# Patient Record
Sex: MALE | Race: ASIAN | URBAN - METROPOLITAN AREA
[De-identification: names, ages, dates, MRNs, and addresses within clinical notes are randomized per-mention and may not be internally consistent; named-entity substitution may affect disease eponyms.]

---

## 2017-02-02 ENCOUNTER — OFFICE VISIT (OUTPATIENT)
Dept: FAMILY MEDICINE | Facility: CLINIC | Age: 32
End: 2017-02-02

## 2017-02-02 VITALS
TEMPERATURE: 99.8 F | RESPIRATION RATE: 18 BRPM | DIASTOLIC BLOOD PRESSURE: 70 MMHG | HEIGHT: 66 IN | HEART RATE: 88 BPM | BODY MASS INDEX: 29.41 KG/M2 | SYSTOLIC BLOOD PRESSURE: 116 MMHG | WEIGHT: 183 LBS | OXYGEN SATURATION: 97 %

## 2017-02-02 DIAGNOSIS — J45.901 ASTHMA EXACERBATION: Primary | ICD-10-CM

## 2017-02-02 RX ORDER — PREDNISONE 20 MG/1
40 TABLET ORAL DAILY
Qty: 10 TABLET | Refills: 0 | Status: SHIPPED | OUTPATIENT
Start: 2017-02-02 | End: 2017-02-07

## 2017-02-02 RX ORDER — ALBUTEROL SULFATE 90 UG/1
1-2 AEROSOL, METERED RESPIRATORY (INHALATION) EVERY 6 HOURS PRN
Qty: 1 INHALER | Refills: 0 | Status: SHIPPED | OUTPATIENT
Start: 2017-02-02 | End: 2017-03-04

## 2017-02-02 NOTE — Clinical Note
I have completed my note, please review, add tie in statement and close encounter.   Thanks!   Jazmin Bauer

## 2017-02-02 NOTE — MR AVS SNAPSHOT
After Visit Summary   2/2/2017    Kimberlyn Up    MRN: [id number]           Patient Information     Date Of Birth          1985        Visit Information        Provider Department      2/2/2017 8:00 PM Clinician, Day [facility]        Today's Diagnoses     Asthma exacerbation    -  1      Care Instructions    Patient Visit Summary    Patient Name: Kmiberlyn Up  MRN: [id number]    Date of Visit: 2/2/2017    Principle Diagnosis: Residual cough from cold    Physician's Recommendations/Instructions: take Prednisone for five days, and get refill of albuterol in clinic    Lab Tests Performed: none    Follow Up/Results: if condition does not improve or symptoms worsen, return to clinic    Referrals and Instructions: See above    Physician: Dr. Roberto Young          Follow-ups after your visit        Who to contact     Please call your clinic at [phone] to:    Ask questions about your health    Make or cancel appointments    Discuss your medicines    Learn about your test results    Speak to your doctor   If you have compliments or concerns about an experience at your clinic, or if you wish to file a complaint, please contact [facility] Patient Relations at [phone] or email us at [email or web address]         Additional Information About Your Visit        MyChart Information     [employer] gives you secure access to your electronic health record. If you see a primary care provider, you can also send messages to your care team and make appointments. If you have questions, please call your primary care clinic.  If you do not have a primary care provider, please call [phone] and they will assist you.      [employer] is an electronic gateway that provides easy, online access to your medical records. With [employer], you can request a clinic appointment, read your test results, renew a prescription or communicate with your care team.     To access  "your existing account, please contact your [facility] or call [phone] for assistance.        Care EveryWhere ID     This is your Care EveryWhere ID. This could be used by other organizations to access your [place] medical records  [id number]        Your Vitals Were     Pulse Temperature Respirations Height Pulse Oximetry BMI (Body Mass Index)    88 99.8  F (37.7  C) 18 5' 6\" (167.6 cm) 97% 29.54 kg/m2       Blood Pressure from Last 3 Encounters:   02/02/17 116/70    Weight from Last 3 Encounters:   02/02/17 183 lb (83 kg)              Today, you had the following     No orders found for display         Today's Medication Changes          These changes are accurate as of: 2/2/17 11:59 PM.  If you have any questions, ask your nurse or doctor.               Start taking these medicines.        Dose/Directions    albuterol 108 (90 BASE) MCG/ACT Inhaler   Commonly known as:  PROAIR HFA/PROVENTIL HFA/VENTOLIN HFA   Used for:  Asthma exacerbation   Started by:  Day Betts        Dose:  1-2 puff   Inhale 1-2 puffs into the lungs every 6 hours as needed for shortness of breath / dyspnea or wheezing   Quantity:  1 Inhaler   Refills:  0       predniSONE 20 MG tablet   Commonly known as:  DELTASONE   Used for:  Asthma exacerbation   Started by:  Day Betts        Dose:  40 mg   Take 2 tablets (40 mg) by mouth daily for 5 days   Quantity:  10 tablet   Refills:  0            Where to get your medicines      Some of these will need a paper prescription and others can be bought over the counter.  Ask your nurse if you have questions.     Bring a paper prescription for each of these medications     albuterol 108 (90 BASE) MCG/ACT Inhaler    predniSONE 20 MG tablet                Primary Care Provider    None Specified       No primary provider on file.        Thank you!     Thank you for choosing [facility]  for your care. Our goal is always to provide you with " excellent care. Hearing back from our patients is one way we can continue to improve our services. Please take a few minutes to complete the written survey that you may receive in the mail after your visit with us. Thank you!             Your Updated Medication List - Protect others around you: Learn how to safely use, store and throw away your medicines at www.disposemymeds.org.          This list is accurate as of: 2/2/17 11:59 PM.  Always use your most recent med list.                   Brand Name Dispense Instructions for use    albuterol 108 (90 BASE) MCG/ACT Inhaler    PROAIR HFA/PROVENTIL HFA/VENTOLIN HFA    1 Inhaler    Inhale 1-2 puffs into the lungs every 6 hours as needed for shortness of breath / dyspnea or wheezing       predniSONE 20 MG tablet    DELTASONE    10 tablet    Take 2 tablets (40 mg) by mouth daily for 5 days

## 2017-02-03 NOTE — PROGRESS NOTES
"[facility] Progress Note    Chief complaint: Asthma exacerbation    Subjective:  JEREMY visited the clinic on Thursday night because he has been coughing last past month. He reports his cold started with wet cough with clear/yellow mucous, sore throat and runny nose. He no longer has runny nose and sore throat after taking DayQuil the last few days. However, he woke up in the middle of night to use an inhaler to relieve tightness in the chest. He has history of asthma since he was 3 or 4 years old. He normally never uses Ventolin, but he had to use about 20-25 puffs last week. He makes a wheezing sound when he coughs during the visit.     Current Outpatient Prescriptions   Medication Sig Dispense Refill     predniSONE (DELTASONE) 20 MG tablet Take 2 tablets (40 mg) by mouth daily for 5 days 10 tablet 0     albuterol (PROAIR HFA/PROVENTIL HFA/VENTOLIN HFA) 108 (90 BASE) MCG/ACT Inhaler Inhale 1-2 puffs into the lungs every 6 hours as needed for shortness of breath / dyspnea or wheezing 1 Inhaler 0         Objective:   /70 mmHg  Pulse 88  Temp(Src) 99.8  F (37.7  C)  Resp 18  Ht 5' 6\" (167.6 cm)  Wt 183 lb (83.008 kg)  BMI 29.55 kg/m2  SpO2 97%    Assessment:     Condition 1- Asthma exacerbation  DTP: Indication- needs additional therapy   Rationale: JEREMY has wheezing cough and his airway is very tight. He presents complaining of a dry cough, and he needs a medication to relieve his airway constriction.     Plan:  1. Start prednisone, Take 2 tablets (40mg) by mouth for 5 days.   2. Continue taking ventolin, inhale 2 puffs by mouth every 6 hours as needed.  3. Follow-up phone call in one week to see if his symptoms have gotten any better. Check if he has any adverse reactions such as increased appetite, diarrhea, vomiting, and constipation.              Pharmacy Follow-Up Plan (Method, Date, Parameters):     PharmCare Clinician: Jazmin Bauer  Pharmacy Preceptor: Angel Chawla " PharmD    _____________________________  Preceptor Use Only:  In supervising the student, I have reviewed and verified the student's documentation and found it to be correct and complete.   Preceptor Signature: Angel Chawla PharmD, BCPS  HPI      ROS      Physical Exam

## 2017-02-03 NOTE — PATIENT INSTRUCTIONS
Patient Visit Summary    Patient Name: Kimberlyn Up  MRN: [id number]    Date of Visit: 2/2/2017    Principle Diagnosis: Residual cough from cold    Physician's Recommendations/Instructions: take Prednisone for five days, and get refill of albuterol in clinic    Lab Tests Performed: none    Follow Up/Results: if condition does not improve or symptoms worsen, return to clinic    Referrals and Instructions: See above    Physician: Dr. Roberto Young

## 2017-02-03 NOTE — PROGRESS NOTES
"Pt complains of cough for past month. States it is \"a little better now,\" but came in to clinic today r/t waking up in middle of night needing to use inhaler. Hx of asthma since he was 3 or 5 yo. Uses ventolin inhaler as needed.  Cough is aggravated by laying down. Relieved by sitting up, using inhaler, and taking a drink of water when caused by dry throat. Prior to one week ago, pt states cough was worse, with white/yellow mucus production. Pt states his cough is \"drier now.\" No complaints of pain with cough prior or currently. Began taking DayQuil on 1/25/17, stopped taking on 1/31/17. Took benadryl last night with no relief. Pt states he has not had a runny nose since taking the DayQuil. No complaints of sore throat prior or currently. Pt states moderate activity (e.g., fast walking) causes him to get \"really tired.\"  "

## 2017-02-03 NOTE — PROGRESS NOTES
MEDICINE NOTE    SUBJECTIVE:  Kimberlyn is a 31 year old man with a history of asthma since childhood who presents to clinic with a cough lasting one month and difficulty breathing in the past few days. It began as a wet cough, producing clear and occasionally yellow mucus, accompanied by a runny nose. He has had no fevers and no chest pain. Around the time that the cough began his wife and kids experienced similar symptoms, but their illnesses resolved within a week or so while his continued for a month. Just in the past week, his cough has become more dry and the runny nose has resolved. However, he is now experiencing more difficulty breathing, with a wheezing sensation that he associates with his past experience of asthma. He says he has needed about 20-25 puffs of his rescue inhaler in the past week, including waking up in the middle of the night the past two nights and needing to use the inhaler. He typically does not use his inhaler at all, and had not used it a single time in the several months preceding this episode. Other past triggers of his asthma have included dust exposure, but the last time he remembers having problems with his breathing was four years ago, in 2012, when he visited [facility] with a similar presentation. He recalls being prescribed a 5 or so day course of an oral medication at that time (which notes from that visit show was oral prednisone), and this helped his symptoms resolve.    REVIEW OF SYSTEMS:  Gen: no fevers or chills, no weight change  Ears, Noses, Mouth, Throat: no sore throat, runny nose earlier in the course of the illness  Cardiac: no chest pain or palpitations  Lungs: wheezing, intermittent dyspnea, cough, no hemoptysis    No past medical history on file.    No past surgical history on file.    No family history on file.    Social History     Social History     Marital Status:      Spouse Name: N/A     Number of Children: N/A     Years of Education: N/A     Occupational  "History     Not on file.     Social History Main Topics     Smoking status: Never Smoker      Smokeless tobacco: Not on file     Alcohol Use: No     Drug Use: No     Sexual Activity: Not on file     Other Topics Concern     Not on file     Social History Narrative     No narrative on file       OBJECTIVE:  Physical Exam:  /70 mmHg  Pulse 88  Temp(Src) 99.8  F (37.7  C)  Resp 18  Ht 5' 6\" (167.6 cm)  Wt 183 lb (83.008 kg)  BMI 29.55 kg/m2  SpO2 97%  Constitutional: no acute distress, though breathing with some difficulty with audible wheezing   Ears, Nose and Throat: no cervical lymphadenopathy.   Respiratory: bilateral inspiratory and expiratory wheezes in all lung fields, more prominent in the upper lobes than the lower    Psychological: appropriate mood     ASSESSMENT/PLAN:  Kimberlyn's wheezing in the aftermath of a respiratory infection, his history of asthma, and the temporary relief of his symptoms with use of his rescue inhaler are all indicative of an asthma exacerbation. He is being given a 5 day course of 40 mg oral prednisone daily to reduce the inflammation in his airways and hopefully resolve the exacerbation. He is also receiving a refill of his rescue inhaler for future need. Because he is currently having asthma symptoms solely in the context of exacerbating factors like respiratory infections, there does not seem to be a need for a daily maintenance inhaler at this time.      Kimberlyn was seen today for cough.    Diagnoses and all orders for this visit:    Asthma exacerbation  -     predniSONE (DELTASONE) 20 MG tablet; Take 2 tablets (40 mg) by mouth daily for 5 days  -     albuterol (PROAIR HFA/PROVENTIL HFA/VENTOLIN HFA) 108 (90 BASE) MCG/ACT Inhaler; Inhale 1-2 puffs into the lungs every 6 hours as needed for shortness of breath / dyspnea or wheezing        Med Clinician: Faith Pham  Preceptor: Dr. Roberto Young    In supervising the medical student, I repeated the exam documented above.  I " have reviewed and verified the student s documentation.  Supervising Provider: Roberto Young